# Patient Record
Sex: MALE | Race: WHITE | NOT HISPANIC OR LATINO | URBAN - NONMETROPOLITAN AREA
[De-identification: names, ages, dates, MRNs, and addresses within clinical notes are randomized per-mention and may not be internally consistent; named-entity substitution may affect disease eponyms.]

---

## 2021-04-08 DIAGNOSIS — Z23 ENCOUNTER FOR IMMUNIZATION: ICD-10-CM

## 2021-04-16 ENCOUNTER — IMMUNIZATIONS (OUTPATIENT)
Dept: FAMILY MEDICINE CLINIC | Facility: HOSPITAL | Age: 66
End: 2021-04-16

## 2021-04-16 DIAGNOSIS — Z23 ENCOUNTER FOR IMMUNIZATION: Primary | ICD-10-CM

## 2021-04-16 PROCEDURE — 0001A SARS-COV-2 / COVID-19 MRNA VACCINE (PFIZER-BIONTECH) 30 MCG: CPT

## 2021-04-16 PROCEDURE — 91300 SARS-COV-2 / COVID-19 MRNA VACCINE (PFIZER-BIONTECH) 30 MCG: CPT

## 2021-05-12 ENCOUNTER — IMMUNIZATIONS (OUTPATIENT)
Dept: FAMILY MEDICINE CLINIC | Facility: HOSPITAL | Age: 66
End: 2021-05-12

## 2021-05-12 DIAGNOSIS — Z23 ENCOUNTER FOR IMMUNIZATION: Primary | ICD-10-CM

## 2021-05-12 PROCEDURE — 0002A SARS-COV-2 / COVID-19 MRNA VACCINE (PFIZER-BIONTECH) 30 MCG: CPT

## 2021-05-12 PROCEDURE — 91300 SARS-COV-2 / COVID-19 MRNA VACCINE (PFIZER-BIONTECH) 30 MCG: CPT

## 2024-08-04 ENCOUNTER — APPOINTMENT (OUTPATIENT)
Dept: RADIOLOGY | Facility: CLINIC | Age: 69
End: 2024-08-04
Payer: COMMERCIAL

## 2024-08-04 ENCOUNTER — OFFICE VISIT (OUTPATIENT)
Dept: URGENT CARE | Facility: CLINIC | Age: 69
End: 2024-08-04
Payer: COMMERCIAL

## 2024-08-04 VITALS
DIASTOLIC BLOOD PRESSURE: 90 MMHG | SYSTOLIC BLOOD PRESSURE: 148 MMHG | RESPIRATION RATE: 16 BRPM | HEIGHT: 74 IN | TEMPERATURE: 97.9 F | OXYGEN SATURATION: 98 % | WEIGHT: 220 LBS | HEART RATE: 77 BPM | BODY MASS INDEX: 28.23 KG/M2

## 2024-08-04 DIAGNOSIS — S99.922A INJURY OF TOE ON LEFT FOOT, INITIAL ENCOUNTER: Primary | ICD-10-CM

## 2024-08-04 DIAGNOSIS — S99.922A INJURY OF TOE ON LEFT FOOT, INITIAL ENCOUNTER: ICD-10-CM

## 2024-08-04 PROCEDURE — G0382 LEV 3 HOSP TYPE B ED VISIT: HCPCS

## 2024-08-04 PROCEDURE — 73630 X-RAY EXAM OF FOOT: CPT

## 2024-08-04 RX ORDER — METOPROLOL SUCCINATE 25 MG/1
TABLET, EXTENDED RELEASE ORAL
COMMUNITY

## 2024-08-04 RX ORDER — CEPHALEXIN 500 MG/1
500 CAPSULE ORAL EVERY 12 HOURS SCHEDULED
Qty: 14 CAPSULE | Refills: 0 | Status: SHIPPED | OUTPATIENT
Start: 2024-08-04 | End: 2024-08-11

## 2024-08-04 RX ORDER — ASPIRIN 81 MG/1
81 TABLET, COATED ORAL DAILY
COMMUNITY

## 2024-08-04 RX ORDER — VALSARTAN 80 MG/1
TABLET ORAL
COMMUNITY
Start: 2024-07-30

## 2024-08-04 RX ORDER — HYDROCHLOROTHIAZIDE 25 MG/1
25 TABLET ORAL DAILY
COMMUNITY

## 2024-08-04 RX ORDER — EZETIMIBE 10 MG/1
10 TABLET ORAL DAILY
COMMUNITY

## 2024-08-04 RX ORDER — ATORVASTATIN CALCIUM 40 MG/1
TABLET, FILM COATED ORAL
COMMUNITY

## 2024-08-04 NOTE — PATIENT INSTRUCTIONS
Take antibiotic as prescribed  Tylenol or ibuprofen as needed  Ice as needed. Do not place ice directly on skin  Elevate as needed  Can apply ace bandage on foot as needed  Follow up with PCP in 3-5 days.  Proceed to  ER if symptoms worsen.    If tests are performed, our office will contact you with results only if changes need to made to the care plan discussed with you at the visit. You can review your full results on [facility].

## 2024-08-04 NOTE — PROGRESS NOTES
[facility]        NAME: Mushtaq Brown is a 68 y.o. male  : 1955    MRN: [id number]  DATE: 2024  TIME: 9:44 AM    Assessment and Plan   Injury of toe on left foot, initial encounter [S99.922A]  1. Injury of toe on left foot, initial encounter  cephalexin (KEFLEX) 500 mg capsule    XR foot 3+ vw left        Preliminary xray read by myself. No acute osseous abnormality noted. Pending radiologist final read.      Due to mechanism of injury with slightly opened wound and swelling and pain, will start oral antibiotics for skin infection.  Went over signs and symptoms to monitor.  Patient verbalized understanding.    Patient Instructions     Take antibiotic as prescribed  Tylenol or ibuprofen as needed  Ice as needed. Do not place ice directly on skin  Elevate as needed  Can apply ace bandage on foot as needed  Follow up with PCP in 3-5 days.  Proceed to  ER if symptoms worsen.    If tests are performed, our office will contact you with results only if changes need to made to the care plan discussed with you at the visit. [facility]    Chief Complaint     Chief Complaint   Patient presents with    Toe Pain     Hit left great toe with weed braxton X 2 days ago. Toe swollen and painful. Pain radiating up top of foot         History of Present Illness       Patient is presenting with left great toe pain x 2 days.  He stated he hit it with a weed Braxton.  His toe was swollen and painful.  He states that the pain radiates to the top of his foot. He denies draining from the lesion.     Toe Pain   Pertinent negatives include no numbness or tingling.       Review of Systems   Review of Systems   Constitutional: Negative.    Respiratory: Negative.     Cardiovascular: Negative.    Musculoskeletal:  Positive for arthralgias (L great toe).   Neurological:  Negative for tingling and numbness.         Current Medications       Current Outpatient Medications:      "Aspirin Low Dose 81 MG EC tablet, Take 81 mg by mouth daily, Disp: , Rfl:     atorvastatin (LIPITOR) 40 mg tablet, Take by mouth, Disp: , Rfl:     cephalexin (KEFLEX) 500 mg capsule, Take 1 capsule (500 mg total) by mouth every 12 (twelve) hours for 7 days, Disp: 14 capsule, Rfl: 0    ezetimibe (ZETIA) 10 mg tablet, Take 10 mg by mouth daily, Disp: , Rfl:     hydroCHLOROthiazide 25 mg tablet, Take 25 mg by mouth daily, Disp: , Rfl:     metoprolol succinate (TOPROL-XL) 25 mg 24 hr tablet, TAKE 1 TABLET (25 MG TOTAL) BY MOUTH DAILY., Disp: , Rfl:     valsartan (DIOVAN) 80 mg tablet, , Disp: , Rfl:     Current Allergies     Allergies as of 08/04/2024 - never reviewed   Allergen Reaction Noted    Peanut oil - food allergy Anaphylaxis and Hives 01/02/2017            The following portions of the patient's history were reviewed and updated as appropriate: allergies, current medications, past family history, past medical history, past social history, past surgical history and problem list.     Past Medical History:   Diagnosis Date    Hyperlipidemia     Hypertension     Myocardial infarction (HCC)        Past Surgical History:   Procedure Laterality Date    CORONARY ANGIOPLASTY WITH STENT PLACEMENT         History reviewed. No pertinent family history.      Medications have been verified.        Objective   /90   Pulse 77   Temp 97.9 °F (36.6 °C)   Resp 16   Ht 6' 2\" (1.88 m)   Wt 99.8 kg (220 lb)   SpO2 98%   BMI 28.25 kg/m²        Physical Exam     Physical Exam  Constitutional:       Appearance: Normal appearance.   Cardiovascular:      Rate and Rhythm: Normal rate.      Pulses: Normal pulses.   Pulmonary:      Effort: Pulmonary effort is normal.   Musculoskeletal:         General: Swelling present. No tenderness.   Skin:     General: Skin is warm and dry.      Findings: Lesion present.   Neurological:      General: No focal deficit present.      Mental Status: He is alert and oriented to person, place, and " time. Mental status is at baseline.

## 2024-12-10 ENCOUNTER — TELEPHONE (OUTPATIENT)
Dept: UROLOGY | Facility: CLINIC | Age: 69
End: 2024-12-10

## 2024-12-10 ENCOUNTER — APPOINTMENT (OUTPATIENT)
Dept: LAB | Facility: CLINIC | Age: 69
End: 2024-12-10
Payer: COMMERCIAL

## 2024-12-10 DIAGNOSIS — R97.20 BPH WITH ELEVATED PSA AND LOWER URINARY TRACT SYMPTOMS: Primary | ICD-10-CM

## 2024-12-10 DIAGNOSIS — N40.1 BPH WITH ELEVATED PSA AND LOWER URINARY TRACT SYMPTOMS: ICD-10-CM

## 2024-12-10 DIAGNOSIS — R97.20 BPH WITH ELEVATED PSA AND LOWER URINARY TRACT SYMPTOMS: ICD-10-CM

## 2024-12-10 DIAGNOSIS — N40.1 BPH WITH ELEVATED PSA AND LOWER URINARY TRACT SYMPTOMS: Primary | ICD-10-CM

## 2024-12-10 LAB — PSA SERPL-MCNC: 1.19 NG/ML (ref 0–4)

## 2024-12-10 PROCEDURE — 84153 ASSAY OF PSA TOTAL: CPT

## 2024-12-10 PROCEDURE — 36415 COLL VENOUS BLD VENIPUNCTURE: CPT

## 2024-12-10 RX ORDER — SILDENAFIL 100 MG/1
TABLET, FILM COATED ORAL
COMMUNITY
End: 2024-12-18

## 2024-12-10 RX ORDER — CANDESARTAN 32 MG/1
32 TABLET ORAL
COMMUNITY
End: 2024-12-18

## 2024-12-10 RX ORDER — TAMSULOSIN HYDROCHLORIDE 0.4 MG/1
CAPSULE ORAL
COMMUNITY
End: 2024-12-18

## 2024-12-18 ENCOUNTER — OFFICE VISIT (OUTPATIENT)
Dept: UROLOGY | Facility: CLINIC | Age: 69
End: 2024-12-18
Payer: COMMERCIAL

## 2024-12-18 VITALS
TEMPERATURE: 97.9 F | HEIGHT: 74 IN | SYSTOLIC BLOOD PRESSURE: 152 MMHG | BODY MASS INDEX: 32.19 KG/M2 | HEART RATE: 84 BPM | WEIGHT: 250.8 LBS | DIASTOLIC BLOOD PRESSURE: 80 MMHG | OXYGEN SATURATION: 97 %

## 2024-12-18 DIAGNOSIS — N40.1 BPH WITH ELEVATED PSA AND LOWER URINARY TRACT SYMPTOMS: Primary | ICD-10-CM

## 2024-12-18 DIAGNOSIS — R97.20 BPH WITH ELEVATED PSA AND LOWER URINARY TRACT SYMPTOMS: Primary | ICD-10-CM

## 2024-12-18 LAB
SL AMB  POCT GLUCOSE, UA: ABNORMAL
SL AMB LEUKOCYTE ESTERASE,UA: ABNORMAL
SL AMB POCT BILIRUBIN,UA: ABNORMAL
SL AMB POCT BLOOD,UA: ABNORMAL
SL AMB POCT CLARITY,UA: CLEAR
SL AMB POCT COLOR,UA: YELLOW
SL AMB POCT KETONES,UA: ABNORMAL
SL AMB POCT NITRITE,UA: ABNORMAL
SL AMB POCT PH,UA: 7
SL AMB POCT SPECIFIC GRAVITY,UA: 1.02
SL AMB POCT URINE PROTEIN: ABNORMAL
SL AMB POCT UROBILINOGEN: 0.2

## 2024-12-18 PROCEDURE — 81003 URINALYSIS AUTO W/O SCOPE: CPT | Performed by: UROLOGY

## 2024-12-18 PROCEDURE — 99203 OFFICE O/P NEW LOW 30 MIN: CPT | Performed by: UROLOGY

## 2024-12-18 RX ORDER — ATORVASTATIN CALCIUM 80 MG/1
80 TABLET, FILM COATED ORAL DAILY
COMMUNITY
Start: 2024-11-03

## 2024-12-18 NOTE — PROGRESS NOTES
UROLOGY PROGRESS NOTE         NAME: Mushtaq Brown  AGE: 69 y.o. SEX: male  : 1955   MRN: [id number]    DATE: 2024  TIME: 3:18 PM    Assessment and Plan      Impression:   1. BPH with elevated PSA and lower urinary tract symptoms  -     POCT urine dip auto non-scope  Significant voiding dysfunction.  Failed multiple medications and a UroLift procedure actually made him worse.     Plan: He will do a voiding diary.  Repeat cystoscopy.  Start some Gemtesa samples.      Chief Complaint     Chief Complaint   Patient presents with    New Patient Visit     Patient here today as a new patient. Patient had PSA done on 12/10/24 it was 1.186     History of Present Illness     HPI: Mushtaq Brown is a 69 y.o. year old male who presents with history of significant voiding dysfunction dysfunction.  Voids hourly with some urgency at times.  Occasional urge incontinence.  No hematuria no dysuria.  Nocturia 3-5 times a night.  Flow can be slow or good.  He had a UroLift procedure 3 years ago.  His symptoms actually were worse.  The Myrbetriq samples helped him.  Trospium did not.  He has ongoing erectile dysfunction.  Currently has some fullness no rigidity.  Libido is good.  Recent PSA 1.18 had a headache with the Cialis.  Headache with the Viagra.  Also medications did not help.    We will get a voiding diary on him.  He does have some lower leg edema.  We talked about the pros and cons and remaining treatment options for erectile dysfunction.  He will give that consideration with his wife.  His wife is here with him for the visit today.              The following portions of the patient's history were reviewed and updated as appropriate: allergies, current medications, past family history, past medical history, past social history, past surgical history and problem list.  Past Medical History:   Diagnosis Date    Hyperlipidemia     Hypertension     Myocardial infarction (HCC)      Past Surgical History:  "  Procedure Laterality Date    BACK SURGERY      CORONARY ANGIOPLASTY WITH STENT PLACEMENT      HERNIA REPAIR       shoulder  Review of Systems     Const: Denies chills, fever and weight loss.  CV: Denies chest pain.  Resp: Denies SOB.  GI: Denies abdominal pain, nausea and vomiting.  : Denies symptoms other than stated above.  Musculo: Denies back pain.    Objective   /80   Pulse 84   Temp 97.9 °F (36.6 °C)   Ht 6' 2\" (1.88 m)   Wt 114 kg (250 lb 12.8 oz)   SpO2 97%   BMI 32.20 kg/m²     Physical Exam  Const: Appears healthy and well developed. No signs of acute distress present.  Resp: Respirations are regular and unlabored.   CV: Rate is regular. Rhythm is regular.  Abdomen: Abdomen is soft, nontender, and nondistended. Kidneys are not palpable.  : Penis testicles epididymis normal.  No hernias.  Prostate 1+ benign.  Bilateral lower leg mild pitting edema.  Psych: Patient's attitude is cooperative. Mood is normal. Affect is normal.    Current Medications     Current Outpatient Medications:     Aspirin Low Dose 81 MG EC tablet, Take 81 mg by mouth daily, Disp: , Rfl:     atorvastatin (LIPITOR) 80 mg tablet, Take 80 mg by mouth daily, Disp: , Rfl:     ezetimibe (ZETIA) 10 mg tablet, Take 10 mg by mouth daily, Disp: , Rfl:     hydroCHLOROthiazide 25 mg tablet, Take 25 mg by mouth daily, Disp: , Rfl:     metoprolol succinate (TOPROL-XL) 25 mg 24 hr tablet, TAKE 1 TABLET (25 MG TOTAL) BY MOUTH DAILY., Disp: , Rfl:     valsartan (DIOVAN) 80 mg tablet, Take 80 mg by mouth daily, Disp: , Rfl:         Frank D'Amico, MD        "

## 2025-01-16 ENCOUNTER — TELEPHONE (OUTPATIENT)
Dept: UROLOGY | Facility: CLINIC | Age: 70
End: 2025-01-16

## 2025-01-16 NOTE — TELEPHONE ENCOUNTER
Patient returned call from RN to see if appt for 1/17 was still avail. Unfortunately that appt time had been taken.    Please monitor for cancellations and contact patient to inform.

## 2025-01-16 NOTE — TELEPHONE ENCOUNTER
VM left for pt, we have an opening tomorrow at 11am for cystoscope. If he calls back and could take that appt, please book.

## 2025-03-28 ENCOUNTER — PROCEDURE VISIT (OUTPATIENT)
Dept: UROLOGY | Facility: CLINIC | Age: 70
End: 2025-03-28
Payer: COMMERCIAL

## 2025-03-28 VITALS — WEIGHT: 254.6 LBS | BODY MASS INDEX: 32.67 KG/M2 | TEMPERATURE: 97.9 F | HEIGHT: 74 IN

## 2025-03-28 DIAGNOSIS — N40.1 BPH WITH ELEVATED PSA AND LOWER URINARY TRACT SYMPTOMS: Primary | ICD-10-CM

## 2025-03-28 DIAGNOSIS — R97.20 BPH WITH ELEVATED PSA AND LOWER URINARY TRACT SYMPTOMS: Primary | ICD-10-CM

## 2025-03-28 LAB
SL AMB  POCT GLUCOSE, UA: ABNORMAL
SL AMB LEUKOCYTE ESTERASE,UA: ABNORMAL
SL AMB POCT BILIRUBIN,UA: ABNORMAL
SL AMB POCT BLOOD,UA: ABNORMAL
SL AMB POCT CLARITY,UA: CLEAR
SL AMB POCT COLOR,UA: YELLOW
SL AMB POCT KETONES,UA: ABNORMAL
SL AMB POCT NITRITE,UA: ABNORMAL
SL AMB POCT PH,UA: 5.5
SL AMB POCT SPECIFIC GRAVITY,UA: 1.02
SL AMB POCT URINE PROTEIN: ABNORMAL
SL AMB POCT UROBILINOGEN: 0.2

## 2025-03-28 PROCEDURE — 99213 OFFICE O/P EST LOW 20 MIN: CPT | Performed by: UROLOGY

## 2025-03-28 PROCEDURE — 81003 URINALYSIS AUTO W/O SCOPE: CPT | Performed by: UROLOGY

## 2025-03-28 PROCEDURE — 52000 CYSTOURETHROSCOPY: CPT | Performed by: UROLOGY

## 2025-03-28 RX ORDER — CYANOCOBALAMIN (VITAMIN B-12) 500 MCG
TABLET ORAL
COMMUNITY
Start: 2025-03-24

## 2025-03-28 RX ORDER — CEPHALEXIN 500 MG/1
500 CAPSULE ORAL ONCE
Status: COMPLETED | OUTPATIENT
Start: 2025-03-28 | End: 2025-03-28

## 2025-03-28 RX ORDER — FUROSEMIDE 20 MG/1
20 TABLET ORAL DAILY
Qty: 30 TABLET | Refills: 11 | Status: SHIPPED | OUTPATIENT
Start: 2025-03-28

## 2025-03-28 RX ADMIN — CEPHALEXIN 500 MG: 500 CAPSULE ORAL at 14:24

## 2025-03-28 NOTE — PROGRESS NOTES
"UROLOGY PROGRESS NOTE         NAME: Mushtaq Brown  AGE: 69 y.o. SEX: male  : 1955   MRN: [id number]    DATE: 3/28/2025  TIME: 2:10 PM    Assessment and Plan      Impression:   1. BPH with elevated PSA and lower urinary tract symptoms  -     cephalexin (KEFLEX) capsule 500 mg  -     POCT urine dip auto non-scope    Patient has significant nocturia his voiding diary shows that he makes nearly 50 ounces of urine at nighttime.  He has pretty good urine volumes for the most part.  As high as 16 ounces as low as 6 ounces per void.  He gets up anywhere from 3-6 times per night.  Flow is usually pretty good.   Plan: We will start Lasix 20 mg at 6 PM to see if this makes a difference.  He is not really interested in compression stockings.  Will have him hold his hydrochlorothiazide while he is on this medication.      Chief Complaint     Chief Complaint   Patient presents with    Cystoscopy     Pt c/o urine frequency.      History of Present Illness     HPI: Mushtaq Brown is a 69 y.o. year old male who presents with here for evaluation of his significant frequency of urination and nocturia 3-6 times per night.  Voiding diary shows high volumes per void.  50 ounces typically per night.  He does not drink an excessive amount of fluid.  He is on hydrochlorothiazide in the morning.  History of cardiac disease.  He has mild bilateral pitting edema.    He has failed medications.  He had a UroLift procedure 3 years ago.       Cystoscopy     Date/Time  3/28/2025 1:30 PM     Performed by  Frank D'Amico, MD   Authorized by  Frank D'Amico, MD     Universal Protocol:  procedure performed by consultantConsent: Verbal consent obtained. Written consent obtained.  Risks and benefits: risks, benefits and alternatives were discussed  Consent given by: patient  Time out: Immediately prior to procedure a \"time out\" was called to verify the correct patient, procedure, equipment, support staff and site/side marked as " "required.  Timeout called at: 3/28/2025 2:12 PM.  Patient identity confirmed: verbally with patient      Procedure Details:  Procedure type: cystoscopy    Patient tolerance: Patient tolerated the procedure well with no immediate complications    Additional Procedure Details: Patient underwent flexible digital cystoscopy.  Supine position.  Prepped with Betadine lidocaine jelly.  Cystoscopy was normal urethra.  His prostate appears to be open with UroLift clip tablets in place.  Anterior channel was noted.  Bladder was mildly trabeculated.  PVR was not very high.  No tumors.  No diverticuli.  No misplaced UroLift clips.  No stones.  Orifices normal.  1 dose of Cipro.                  The following portions of the patient's history were reviewed and updated as appropriate: allergies, current medications, past family history, past medical history, past social history, past surgical history and problem list.  Past Medical History:   Diagnosis Date    Hyperlipidemia     Hypertension     Myocardial infarction (HCC)      Past Surgical History:   Procedure Laterality Date    BACK SURGERY      CORONARY ANGIOPLASTY WITH STENT PLACEMENT      HERNIA REPAIR       shoulder  Review of Systems     Const: Denies chills, fever and weight loss.  CV: Denies chest pain.  Resp: Denies SOB.  GI: Denies abdominal pain, nausea and vomiting.  : Denies symptoms other than stated above.  Musculo: Denies back pain.    Objective   Temp 97.9 °F (36.6 °C)   Ht 6' 2\" (1.88 m)   Wt 115 kg (254 lb 9.6 oz)   BMI 32.69 kg/m²     Physical Exam  Const: Appears healthy and well developed. No signs of acute distress present.  Resp: Respirations are regular and unlabored.   CV: Rate is regular. Rhythm is regular.  Abdomen: Abdomen is soft, nontender, and nondistended. Kidneys are not palpable.  :   Psych: Patient's attitude is cooperative. Mood is normal. Affect is normal.    Current Medications     Current Outpatient Medications:     Aspirin Low " Dose 81 MG EC tablet, Take 81 mg by mouth daily, Disp: , Rfl:     atorvastatin (LIPITOR) 80 mg tablet, Take 80 mg by mouth daily, Disp: , Rfl:     Cyanocobalamin (Vitamin B 12) 500 MCG TABS, Take by mouth, Disp: , Rfl:     ezetimibe (ZETIA) 10 mg tablet, Take 10 mg by mouth daily, Disp: , Rfl:     hydroCHLOROthiazide 25 mg tablet, Take 25 mg by mouth daily, Disp: , Rfl:     metoprolol succinate (TOPROL-XL) 25 mg 24 hr tablet, TAKE 1 TABLET (25 MG TOTAL) BY MOUTH DAILY., Disp: , Rfl:     valsartan (DIOVAN) 80 mg tablet, Take 80 mg by mouth daily, Disp: , Rfl:     Current Facility-Administered Medications:     cephalexin (KEFLEX) capsule 500 mg, 500 mg, Oral, Once,         Frank D'Amico, MD

## 2025-04-19 DIAGNOSIS — N40.1 BPH WITH ELEVATED PSA AND LOWER URINARY TRACT SYMPTOMS: ICD-10-CM

## 2025-04-19 DIAGNOSIS — R97.20 BPH WITH ELEVATED PSA AND LOWER URINARY TRACT SYMPTOMS: ICD-10-CM

## 2025-04-21 ENCOUNTER — OFFICE VISIT (OUTPATIENT)
Dept: UROLOGY | Facility: CLINIC | Age: 70
End: 2025-04-21
Payer: COMMERCIAL

## 2025-04-21 VITALS
OXYGEN SATURATION: 95 % | HEART RATE: 82 BPM | TEMPERATURE: 97.5 F | DIASTOLIC BLOOD PRESSURE: 88 MMHG | BODY MASS INDEX: 32.98 KG/M2 | SYSTOLIC BLOOD PRESSURE: 148 MMHG | WEIGHT: 257 LBS | HEIGHT: 74 IN

## 2025-04-21 DIAGNOSIS — R97.20 BPH WITH ELEVATED PSA AND LOWER URINARY TRACT SYMPTOMS: Primary | ICD-10-CM

## 2025-04-21 DIAGNOSIS — N40.1 BPH WITH ELEVATED PSA AND LOWER URINARY TRACT SYMPTOMS: Primary | ICD-10-CM

## 2025-04-21 LAB
SL AMB  POCT GLUCOSE, UA: NORMAL
SL AMB LEUKOCYTE ESTERASE,UA: NORMAL
SL AMB POCT BILIRUBIN,UA: NORMAL
SL AMB POCT BLOOD,UA: NORMAL
SL AMB POCT CLARITY,UA: CLEAR
SL AMB POCT COLOR,UA: YELLOW
SL AMB POCT KETONES,UA: NORMAL
SL AMB POCT NITRITE,UA: NORMAL
SL AMB POCT PH,UA: 6.5
SL AMB POCT SPECIFIC GRAVITY,UA: 1.02
SL AMB POCT URINE PROTEIN: NORMAL
SL AMB POCT UROBILINOGEN: 0.2

## 2025-04-21 PROCEDURE — 81003 URINALYSIS AUTO W/O SCOPE: CPT | Performed by: UROLOGY

## 2025-04-21 PROCEDURE — 99213 OFFICE O/P EST LOW 20 MIN: CPT | Performed by: UROLOGY

## 2025-04-21 RX ORDER — FUROSEMIDE 20 MG/1
20 TABLET ORAL DAILY
Qty: 90 TABLET | Refills: 3 | Status: SHIPPED | OUTPATIENT
Start: 2025-04-21

## 2025-04-21 RX ORDER — DESMOPRESSIN ACETATE 0.2 MG/1
0.2 TABLET ORAL
Qty: 30 TABLET | Refills: 11 | Status: SHIPPED | OUTPATIENT
Start: 2025-04-21

## 2025-04-21 NOTE — PROGRESS NOTES
UROLOGY PROGRESS NOTE         NAME: Mushtaq Brown  AGE: 69 y.o. SEX: male  : 1955   MRN: [id number]    DATE: 2025  TIME: 11:58 AM    Assessment and Plan      Impression:   1. BPH with elevated PSA and lower urinary tract symptoms  -     POCT urine dip auto non-scope  Patient has nocturnal diuresis.  His nocturia improved for 2 days and then is back to his baseline.  He is not really diuresing with the Lasix.     Plan: We will try him on some DDAVP.  He will take it at bedtime.  0.2 mg.  Will check electrolytes on him after he is been on it a week.  Will see him back in a month.  He will stop the Lasix and go back to his hydrochlorothiazide that his family doctor recommended.      Chief Complaint     Chief Complaint   Patient presents with    Benign Prostatic Hypertrophy     History of Present Illness     HPI: Mushtaq Brown is a 69 y.o. year old male who presents with history of significant nocturia multiple times per night 4-6 times per night he makes a lot of urine at night.  The Lasix that we put him on helped initially but then made no difference.  He does have a dry mouth now.  Symptoms are still quite bothersome.  Recent PSA normal.  Status post UroLift years ago.              The following portions of the patient's history were reviewed and updated as appropriate: allergies, current medications, past family history, past medical history, past social history, past surgical history and problem list.  Past Medical History:   Diagnosis Date    Hyperlipidemia     Hypertension     Myocardial infarction (HCC)      Past Surgical History:   Procedure Laterality Date    BACK SURGERY      CORONARY ANGIOPLASTY WITH STENT PLACEMENT      HERNIA REPAIR      OTHER SURGICAL HISTORY      urolift     shoulder  Review of Systems     Const: Denies chills, fever and weight loss.  CV: Denies chest pain.  Resp: Denies SOB.  GI: Denies abdominal pain, nausea and vomiting.  : Denies symptoms other than stated  "above.  Musculo: Denies back pain.    Objective   /88   Pulse 82   Temp 97.5 °F (36.4 °C)   Ht 6' 2\" (1.88 m)   Wt 117 kg (257 lb)   SpO2 95%   BMI 33.00 kg/m²     Physical Exam  Const: Appears healthy and well developed. No signs of acute distress present.  Resp: Respirations are regular and unlabored.   CV: Rate is regular. Rhythm is regular.  Abdomen: Abdomen is soft, nontender, and nondistended. Kidneys are not palpable.  :   Psych: Patient's attitude is cooperative. Mood is normal. Affect is normal.    Current Medications     Current Outpatient Medications:     Aspirin Low Dose 81 MG EC tablet, Take 81 mg by mouth daily, Disp: , Rfl:     atorvastatin (LIPITOR) 80 mg tablet, Take 80 mg by mouth daily, Disp: , Rfl:     Cyanocobalamin (Vitamin B 12) 500 MCG TABS, Take by mouth, Disp: , Rfl:     ezetimibe (ZETIA) 10 mg tablet, Take 10 mg by mouth daily, Disp: , Rfl:     furosemide (LASIX) 20 mg tablet, TAKE 1 TABLET (20 MG TOTAL) BY MOUTH DAILY TAKE AT 6 PM., Disp: 90 tablet, Rfl: 3    metoprolol succinate (TOPROL-XL) 25 mg 24 hr tablet, TAKE 1 TABLET (25 MG TOTAL) BY MOUTH DAILY., Disp: , Rfl:     valsartan (DIOVAN) 80 mg tablet, Take 80 mg by mouth daily, Disp: , Rfl:         Frank D'Amico, MD        "

## 2025-07-12 LAB
BUN SERPL-MCNC: 20 MG/DL (ref 7–25)
BUN/CREAT SERPL: NORMAL (CALC) (ref 6–22)
CALCIUM SERPL-MCNC: 9.3 MG/DL (ref 8.6–10.3)
CHLORIDE SERPL-SCNC: 105 MMOL/L (ref 98–110)
CO2 SERPL-SCNC: 29 MMOL/L (ref 20–32)
CREAT SERPL-MCNC: 1.04 MG/DL (ref 0.7–1.35)
GFR/BSA.PRED SERPLBLD CYS-BASED-ARV: 78 ML/MIN/1.73M2
GLUCOSE SERPL-MCNC: 85 MG/DL (ref 65–99)
POTASSIUM SERPL-SCNC: 3.8 MMOL/L (ref 3.5–5.3)
SODIUM SERPL-SCNC: 142 MMOL/L (ref 135–146)

## 2025-07-25 ENCOUNTER — OFFICE VISIT (OUTPATIENT)
Dept: UROLOGY | Facility: CLINIC | Age: 70
End: 2025-07-25
Payer: COMMERCIAL

## 2025-07-25 VITALS
WEIGHT: 248 LBS | OXYGEN SATURATION: 96 % | HEIGHT: 74 IN | DIASTOLIC BLOOD PRESSURE: 78 MMHG | TEMPERATURE: 98.3 F | SYSTOLIC BLOOD PRESSURE: 138 MMHG | BODY MASS INDEX: 31.83 KG/M2 | HEART RATE: 82 BPM

## 2025-07-25 DIAGNOSIS — R97.20 BPH WITH ELEVATED PSA AND LOWER URINARY TRACT SYMPTOMS: ICD-10-CM

## 2025-07-25 DIAGNOSIS — N40.1 BPH WITH ELEVATED PSA AND LOWER URINARY TRACT SYMPTOMS: ICD-10-CM

## 2025-07-25 PROCEDURE — 99213 OFFICE O/P EST LOW 20 MIN: CPT | Performed by: UROLOGY

## 2025-07-25 RX ORDER — HYDROCHLOROTHIAZIDE 25 MG/1
25 TABLET ORAL DAILY
COMMUNITY
Start: 2025-07-16

## 2025-07-25 RX ORDER — DESMOPRESSIN ACETATE 0.2 MG/1
0.2 TABLET ORAL
Qty: 90 TABLET | Refills: 3 | Status: SHIPPED | OUTPATIENT
Start: 2025-07-25

## 2025-07-25 NOTE — PROGRESS NOTES
"UROLOGY PROGRESS NOTE         NAME: Mushtaq Brown  AGE: 69 y.o. SEX: male  : 1955   MRN: [id number]    DATE: 2025  TIME: 8:37 AM    Assessment and Plan      Impression:   1. BPH with elevated PSA and lower urinary tract symptoms  -     desmopressin (DDAVP) 0.2 mg tablet; Take 1 tablet (0.2 mg total) by mouth daily at bedtime  Patient diuresis at nighttime.  DDAVP seems to be helping him.  Will continue with it we talked about increasing to 2 tablets.  He will need a repeat BMP if he does that.     Plan: 6 months with a PSA.      Chief Complaint     Chief Complaint   Patient presents with    Follow-up     History of Present Illness     HPI: Mushtaq Brown is a 69 y.o. year old male who presents with status post UroLift.  No obstruction.  He diuresis at nighttime.  The DDAVP seems to have helped.  He has multiple nights where he gets up once or twice.  His wife thinks he may have sleep apnea based on how he is breathing after I had a discussion regarding that.  I asked him to talk to his PCP regarding this for possible testing.  Might be some of the reason that he wakes up at night to void.              The following portions of the patient's history were reviewed and updated as appropriate: allergies, current medications, past family history, past medical history, past social history, past surgical history and problem list.  Past Medical History[1]  Past Surgical History[2]  shoulder  Review of Systems     Const: Denies chills, fever and weight loss.  CV: Denies chest pain.  Resp: Denies SOB.  GI: Denies abdominal pain, nausea and vomiting.  : Denies symptoms other than stated above.  Musculo: Denies back pain.    Objective   /78   Pulse 82   Temp 98.3 °F (36.8 °C)   Ht 6' 2\" (1.88 m)   Wt 112 kg (248 lb)   SpO2 96%   BMI 31.84 kg/m²     Physical Exam  Const: Appears healthy and well developed. No signs of acute distress present.  Resp: Respirations are regular and unlabored.   Psych: " Patient's attitude is cooperative. Mood is normal. Affect is normal.    Current Medications   Current Medications[3]        Frank D'Amico, MD             [1]   Past Medical History:  Diagnosis Date    Hyperlipidemia     Hypertension     Myocardial infarction (HCC)    [2]   Past Surgical History:  Procedure Laterality Date    BACK SURGERY      CORONARY ANGIOPLASTY WITH STENT PLACEMENT      HERNIA REPAIR      OTHER SURGICAL HISTORY      urolift   [3]   Current Outpatient Medications:     Aspirin Low Dose 81 MG EC tablet, Take 81 mg by mouth in the morning., Disp: , Rfl:     atorvastatin (LIPITOR) 80 mg tablet, Take 80 mg by mouth in the morning., Disp: , Rfl:     Cyanocobalamin (Vitamin B 12) 500 MCG TABS, Take by mouth, Disp: , Rfl:     desmopressin (DDAVP) 0.2 mg tablet, Take 1 tablet (0.2 mg total) by mouth daily at bedtime, Disp: 90 tablet, Rfl: 3    ezetimibe (ZETIA) 10 mg tablet, Take 10 mg by mouth in the morning., Disp: , Rfl:     hydroCHLOROthiazide 25 mg tablet, Take 25 mg by mouth daily, Disp: , Rfl:     metoprolol succinate (TOPROL-XL) 25 mg 24 hr tablet, , Disp: , Rfl:     valsartan (DIOVAN) 80 mg tablet, Take 80 mg by mouth in the morning., Disp: , Rfl:     furosemide (LASIX) 20 mg tablet, TAKE 1 TABLET (20 MG TOTAL) BY MOUTH DAILY TAKE AT 6 PM. (Patient not taking: Reported on 7/25/2025), Disp: 90 tablet, Rfl: 3